# Patient Record
(demographics unavailable — no encounter records)

---

## 2024-12-10 NOTE — HISTORY OF PRESENT ILLNESS
[N] : Patient denies prior pregnancies [FreeTextEntry1] : 67-year-old G0, P0 presents for GYN evaluation.  Denies pain bleeding or discharge.  She denies hot flashes as well. [PGHxTotal] : 0 [PGHxFullTerm] : 0 [PGHxPremature] : 0 [PGHxAbortions] : 0 [[facility]] : 0 [PGHxABInduced] : 0 [PGHxABSpont] : 0 [PGHxEctopic] : 0 [PGHxMultBirths] : 0

## 2024-12-10 NOTE — DISCUSSION/SUMMARY
[FreeTextEntry1] : 67-year-old  presents for GYN evaluation.  No pain bleeding or discharge.  Physical examination shows atrophic vulva vagina.  Pap GC chlamydia sent.  Bone density also ordered.  Mammogram and breast sonogram ordered.  Diet and exercise reviewed and all questions answered.  Return in 1 year for follow-up.

## 2024-12-10 NOTE — PHYSICAL EXAM
[Chaperone Present] : A chaperone was present in the examining room during all aspects of the physical examination [11101] : A chaperone was present during the pelvic exam. [FreeTextEntry2] : Miguel [Appropriately responsive] : appropriately responsive [Alert] : alert [No Acute Distress] : no acute distress [No Lymphadenopathy] : no lymphadenopathy [Regular Rate Rhythm] : regular rate rhythm [No Murmurs] : no murmurs [Clear to Auscultation B/L] : clear to auscultation bilaterally [Soft] : soft [Non-tender] : non-tender [Non-distended] : non-distended [No HSM] : No HSM [No Lesions] : no lesions [No Mass] : no mass [Oriented x3] : oriented x3 [Examination Of The Breasts] : a normal appearance [No Masses] : no breast masses were palpable [Vulvar Atrophy] : vulvar atrophy [Labia Majora] : normal [Labia Minora] : normal [Atrophy] : atrophy [Normal] : normal [Uterine Adnexae] : normal [Declined] : Patient declined rectal exam